# Patient Record
Sex: MALE | Race: WHITE
[De-identification: names, ages, dates, MRNs, and addresses within clinical notes are randomized per-mention and may not be internally consistent; named-entity substitution may affect disease eponyms.]

---

## 2023-10-17 LAB
BUN BLD-MCNC: 18 MG/DL (ref 7–18)
GLUCOSE SERPLBLD-MCNC: 105 MG/DL (ref 74–106)
HCT VFR BLD CALC: 45 % (ref 39.6–49)
INR BLD: 1.03
LYMPHOCYTES # SPEC AUTO: 1.1 K/UL (ref 0.7–4.9)
MCV RBC: 93.6 FL (ref 80–100)
PMV BLD: 7.8 FL (ref 7.6–11.3)
POTASSIUM SERPL-SCNC: 4 MEQ/L (ref 3.5–5.1)
RBC # BLD: 4.81 M/UL (ref 4.33–5.43)
WBC # BLD AUTO: 7.4 THOU/UL (ref 4.3–10.9)

## 2023-10-17 NOTE — EKG
Test Date:    2023-10-17               Test Time:    10:30:42

Technician:   SHASHANK                                     

                                                     

MEASUREMENT RESULTS:                                       

Intervals:                                           

Rate:         57                                     

IL:           194                                    

QRSD:         82                                     

QT:           394                                    

QTc:          383                                    

Axis:                                                

P:            25                                     

IL:           194                                    

QRS:          52                                     

T:            105                                    

                                                     

INTERPRETIVE STATEMENTS:                                       

                                                     

Sinus bradycardia

Low voltage QRS

ST & T wave abnormality, consider lateral ischemia

Abnormal ECG

Compared to ECG 08/07/2002 21:42:00

Low QRS voltage now present

ST (T wave) deviation now present

Possible ischemia now present

Sinus rhythm no longer present



Electronically Signed On 10-17-23 11:17:04 CDT by Jesus Alberto Rascon

## 2023-10-17 NOTE — RAD REPORT
EXAM DESCRIPTION:  RAD - Chest Pa And Lat (2 Views) - 10/17/2023 11:05 am

 

CLINICAL HISTORY:  pre op pending heart catheterization

Chest pain.

 

COMPARISON:  No comparisons

 

FINDINGS:  The lungs are hyperexpanded but clear. The heart is normal in size. Sternotomy wires prese
nt. Small hiatal hernia.

 

IMPRESSION:  No acute or concerning finding suspected.

 

The [employer] recommends annual screening for lung cancer with low-dose CT (LDCT) in adults aged 50 to 8
0 years who have a 20 pack-year smoking history and currently smoke or have quit within the past 15 y
ears.

## 2023-10-20 ENCOUNTER — HOSPITAL ENCOUNTER (OUTPATIENT)
Dept: HOSPITAL 97 - CCL | Age: 73
Discharge: HOME | End: 2023-10-20
Attending: INTERNAL MEDICINE
Payer: COMMERCIAL

## 2023-10-20 VITALS — DIASTOLIC BLOOD PRESSURE: 60 MMHG | SYSTOLIC BLOOD PRESSURE: 166 MMHG

## 2023-10-20 VITALS — OXYGEN SATURATION: 95 %

## 2023-10-20 DIAGNOSIS — I10: ICD-10-CM

## 2023-10-20 DIAGNOSIS — Z79.82: ICD-10-CM

## 2023-10-20 DIAGNOSIS — F17.200: ICD-10-CM

## 2023-10-20 DIAGNOSIS — E78.5: ICD-10-CM

## 2023-10-20 DIAGNOSIS — Z79.899: ICD-10-CM

## 2023-10-20 DIAGNOSIS — I65.29: ICD-10-CM

## 2023-10-20 DIAGNOSIS — E11.9: ICD-10-CM

## 2023-10-20 DIAGNOSIS — I25.700: ICD-10-CM

## 2023-10-20 DIAGNOSIS — Z82.49: ICD-10-CM

## 2023-10-20 DIAGNOSIS — I25.5: ICD-10-CM

## 2023-10-20 DIAGNOSIS — I25.110: Primary | ICD-10-CM

## 2023-10-20 PROCEDURE — 36415 COLL VENOUS BLD VENIPUNCTURE: CPT

## 2023-10-20 PROCEDURE — 85610 PROTHROMBIN TIME: CPT

## 2023-10-20 PROCEDURE — 71046 X-RAY EXAM CHEST 2 VIEWS: CPT

## 2023-10-20 PROCEDURE — 93005 ELECTROCARDIOGRAM TRACING: CPT

## 2023-10-20 PROCEDURE — 76937 US GUIDE VASCULAR ACCESS: CPT

## 2023-10-20 PROCEDURE — 93459 L HRT ART/GRFT ANGIO: CPT

## 2023-10-20 PROCEDURE — 85730 THROMBOPLASTIN TIME PARTIAL: CPT

## 2023-10-20 PROCEDURE — 80048 BASIC METABOLIC PNL TOTAL CA: CPT

## 2023-10-20 PROCEDURE — 85025 COMPLETE CBC W/AUTO DIFF WBC: CPT
